# Patient Record
(demographics unavailable — no encounter records)

---

## 2024-10-24 NOTE — HISTORY OF PRESENT ILLNESS
[FreeTextEntry1] : Mimi is a pleasant 72-year-old patient presenting today with a chronic history of atraumatic left hand discomfort.  Patient states that daughter is a nurse and advised against steroid medication.

## 2024-10-24 NOTE — ASSESSMENT
[FreeTextEntry1] : ASSESSMENT: The patient comes in today with chronic exacerbated history of atraumatic left hand discomfort.  We have discussed small joint arthropathy and stiffness as well as tendinitis.  With this in mind we discussed treatment options we discussed activity modification.  The patient elects for oral medication.  This medication is not a steroid.  The patient was advised of the risks and benefits in the setting of diabetes.  With this in mind the patient also states that the daughter is a nurse and she advises against steroid medication.  We had a thorough good discussion regarding risks and benefits.   The patient was adequately and thoroughly informed of my assessment of their current condition(s).  - This may diminish bodily function for the extremity. We discussed prognosis, tx modalities including operative and nonoperative options for the above diagnostic assessment. As always, 2nd opinion is always provided as an option.  When accessible, I was able to review other physicians note(s) including reviewing other tests, imaging results as well as personally view these results for my own interpretation.   The patient was adequately and thoroughly informed of my assessment of their current condition(s). A prescription of Tylenol Extra Strength has been given to the patient. The risks, side effects, benefits and black box warnings were discussed.  The patient understands the risk profile and would like to take the medication. DISCUSSION:  1.  Rx as above.  We have discussed activity modification.  They will follow-up as needed 2. [x] 3. [x]

## 2024-10-24 NOTE — PHYSICAL EXAM
[de-identified] : Examination of the hand(s)  particularly at the A1 of the left ring reveals tenderness with a palpable click. Examination of the left middle finger PIP joint reveals tenderness and bogginess.  No signs of infection [de-identified] : [4] views of [bilateral hands and wrists] were obtained today in my office and were seen by me and discussed with the patient.  These [show findings consistent with bilateral basal joint OA and findings of IP joint OA]

## 2024-12-07 NOTE — HEALTH RISK ASSESSMENT
[Yes] : Yes [2 - 3 times a week (3 pts)] : 2 - 3  times a week (3 points) [1 or 2 (0 pts)] : 1 or 2 (0 points) [Never (0 pts)] : Never (0 points) [No falls in past year] : Patient reported no falls in the past year [0] : 2) Feeling down, depressed, or hopeless: Not at all (0) [PHQ-2 Negative - No further assessment needed] : PHQ-2 Negative - No further assessment needed [Former] : Former [> 15 Years] : > 15 Years [Audit-CScore] : 3 [[id number]] : 0

## 2024-12-07 NOTE — ADDENDUM
[FreeTextEntry1] :    IShaniceghar wrote this note acting as a scribe for Dr. Beverly Alberto MD on Dec 02, 2024 .   I, Dr. Beverly Alberto MD, ordering physician, have read and attest that all the information, medical decision making and discharge instructions within are true and accurate on 12/02/2024.

## 2024-12-07 NOTE — HISTORY OF PRESENT ILLNESS
[FreeTextEntry1] : follow up [de-identified] : 73 year y.o female pt is here today for follow up. Pt is in a good mood. Pt had family over thanksgiving and enjoyed spending time with her children. Pt is currently following up with ortho, OBGYN, cardiologist, and endocrinologist doctors. Pt is following up with Dr. Zacarias for weightloss management and DM. Pt sxs are currently stable. No active complaints today. UTD with immunization.

## 2024-12-07 NOTE — PLAN
[FreeTextEntry1] : Pt is here for a 3 month follow up. Pt has a physical exam with her company. everything is normal. Pt stopped smoking cigarettes 20 y.o. Pt is UTD with lung cancer screenings.   endocrinologist: Pt needs to see Dr. Zacarias because she needs prior authorization for 5.5mg Monjara to manage weightloss and DM. Pt also takes 1mg of Zepbound. Pt states without Monjara she has been feeling a bit of an appetite coming back. Her typical weight is 214lbs. Recommended dietary changes including 90-100g of protein, aerobic exercise and emphasized strength training regimen at least 2x weekly to maintain healthy lean muscle mass and bone density.  vaccines: Pt s UTD with flu, shingles, and pneumonia vaccines.    RTO in 3-6 months for f.u.

## 2024-12-07 NOTE — HEALTH RISK ASSESSMENT
[Yes] : Yes [2 - 3 times a week (3 pts)] : 2 - 3  times a week (3 points) [1 or 2 (0 pts)] : 1 or 2 (0 points) [Never (0 pts)] : Never (0 points) [No falls in past year] : Patient reported no falls in the past year [0] : 2) Feeling down, depressed, or hopeless: Not at all (0) [PHQ-2 Negative - No further assessment needed] : PHQ-2 Negative - No further assessment needed [Former] : Former [> 15 Years] : > 15 Years [Audit-CScore] : 3 [[id number]] : 0

## 2024-12-07 NOTE — HISTORY OF PRESENT ILLNESS
[FreeTextEntry1] : follow up [de-identified] : 73 year y.o female pt is here today for follow up. Pt is in a good mood. Pt had family over thanksgiving and enjoyed spending time with her children. Pt is currently following up with ortho, OBGYN, cardiologist, and endocrinologist doctors. Pt is following up with Dr. Zacarias for weightloss management and DM. Pt sxs are currently stable. No active complaints today. UTD with immunization.

## 2025-01-15 NOTE — PLAN
[TextEntry] : 73-year-old female in need of a screening colonoscopy.  She has had 2 recent failed attempts to complete the colonoscopy due to poor prep.  I recommend a 2-day bowel prep.  She will like the pill form of prep and this can be done 2 days prior to the colonoscopy.  She will remain on liquid diet for the 2 days preceding the colonoscopy.  The day before the colonoscopy, she will proceed with the usual MiraLAX and Dulcolax bowel prep. I reviewed the risks, benefits and alternative of pursuing a colonoscopy. Risks of colonoscopy was reviewed including but not limited to cardiopulmonary complications, risk of bleeding, infection, missed lesions, perforation which could result in emergent surgery. Will proceed with scheduling colonoscopy.  Hold Mounjaro for weight prior to procedure

## 2025-01-15 NOTE — HISTORY OF PRESENT ILLNESS
[FreeTextEntry1] : 73-year-old female who presents for consultation for a screening colonoscopy.  She denies any current gastrointestinal symptoms.  She has a history of a Whipple done for an IPMN.  She underwent attempt of colonoscopy with Dr. Wheat twice in the past 6 months but was incomplete due to inadequate prep.  She reports daily bowel movements without difficulty.  No constipation or diarrhea.  I had performed her colonoscopy in 2018 and although was completed, my report states inadequate bowel prep.

## 2025-01-15 NOTE — PHYSICAL EXAM
[Respiratory Effort] : normal respiratory effort [Normal Rate and Rhythm] : normal rate and rhythm [Calm] : calm [de-identified] : Soft, nontender, nondistended.  Midline laparotomy incision noted [de-identified] : well appearing, in no distress [de-identified] : normocephalic, atraumatic [de-identified] : moves extremities without difficulty [de-identified] : warm and dry [de-identified] : alert and oriented x 3

## 2025-01-15 NOTE — CONSULT LETTER
[Dear  ___] : Dear  [unfilled], [Consult Letter:] : I had the pleasure of evaluating your patient, [unfilled]. [Please see my note below.] : Please see my note below. [Consult Closing:] : Thank you very much for allowing me to participate in the care of this patient.  If you have any questions, please do not hesitate to contact me. [Sincerely,] : Sincerely, [FreeTextEntry3] : Emil Parra MD

## 2025-01-21 NOTE — HISTORY OF PRESENT ILLNESS
[FreeTextEntry1] : Interval hx : - had been off Mounjaro for 1 month due to cost issue - tolerating Mounjaro, she stopped Ozempic due to lack of glycemic benefit - losing weight with Mounjaro, appetite reduced more with Mounjaro  T2DM  - worse due to diet, cheating on cookies/cakes She denies history of diabetes but in 12/2018 A1c was 6.6% and she was started on Metformin. She is tolerating it well.  A1c levels have been in prediabetic range prior to this.  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   - did not tolerate glipizide- caused rash DM meds: MFN  mg 2 tabs BID, Jardiance 25 mg daily, Mounjaro 5 mg weekly SMBG: testing 2-3x daily, per pt -160s Complications; no DR on eye exam 2023, 2024 neg urine alb/Cr, denies neuropathy , h/o nonobstructive CAD in past now following with cardio ===================================================================================== h/o small pituitary tumor Dx with elevated prolactin levels (Prl 70's)- on Bromocriptine  5-6 years and dose decrease to 1 tab daily at last visit, she reported worsening prolactin levels off Bromocriptine in past. Has been off Bromocriptine since May 2020 and prolactin levels have been normal. 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 this was stopped 2023 when levels above 200 and started estrogen patches - Normal Bone Density 2024 - Mammo 2024 Birad 2 - PSA 2023 normal

## 2025-01-21 NOTE — DATA REVIEWED
[FreeTextEntry1] : Pituitary MRI 11/14/19 rathke cleft cyst   DXA 6/6/24 RESULTS: Spine: T-score: 1.0; previously . Z-score: 3.2 BMD: 1.155 g/cm2 Radius 1/3: T-score: 2.5; not imaged previously. Z-score: 4.9 BMD: 0847 g/cm2 IMPRESSION: Normal Bone Density. FRACTURE RISK:  The risk of fracture is not increased. TREATMENT RECOMMENDATIONS:  Based on NOF treatment guidelines medical therapy is not recommended at this time.

## 2025-01-21 NOTE — PHYSICAL EXAM
[Alert] : alert [EOMI] : extra ocular movement intact [Normal Rate and Effort] : normal respiratory rate and effort [Clear to Auscultation] : lungs were clear to auscultation bilaterally [Normal S1, S2] : normal S1 and S2 [Normal Rate] : heart rate was normal [No Edema] : no peripheral edema [Not Tender] : non-tender [Soft] : abdomen soft [2+] : 2+ in the dorsalis pedis [Oriented x3] : oriented to person, place, and time [Normal Affect] : the affect was normal [Normal Insight/Judgement] : insight and judgment were intact [Normal Mood] : the mood was normal [Vibration Dec.] : normal vibratory sensation at the level of the toes [Diminished Throughout Both Feet] : normal tactile sensation with monofilament testing throughout both feet [Acanthosis Nigricans] : no acanthosis nigricans

## 2025-01-21 NOTE — REASON FOR VISIT
[Follow - Up] : a follow-up visit [DM Type 2] : DM Type 2 [Transgender Care] : transgender care [Other___] : [unfilled]

## 2025-01-21 NOTE — ASSESSMENT
[FreeTextEntry1] : 71 year old female with:  1. T2DM - A1c worsening, worsening glucoses due to diet - h/o allergic reaction to glipizide - cont Mounjaro 5 mg weekly for 4 weeks, then 7.5 mg weekly x 4 weeks, then 10 mg weekly x 4 weeks, then 12.5 mg weekly x 4 weeks the 15 mg weekly - cont Jardiance 25 mg daily - cont MFN  mg 2 tabs BID - cont SMBG - repeat A1c 1 week before next visit - adhere with diabetic diet, stop sweets!!  2. h/o pituitary tumor on long term Bromocriptine. In 2019 pituitary hormonal testing normal and MRI showed Rathke cleft cyst, no pituitary tumor. has been off bromocriptine since May 2020 - prolactin normal 2022  3. transgender M--> F s/p sex reassignment surgery - DXA 2024 normal - Mammo 2024 [facility] 2, yearly mammo with PCP - PSA 2023 normal, yearly testing - overdue, will check with next labs - limited data in literature in treating old transgender women.  we  discussed continuation of estrogen therapy with low dose estrogen patches to provide some estrogen to protect bones but full replacement doses not needed at this age,  we also discussed risks of estrogen therapy in elderly women with regard to VTE and breast cancer - cont estradiol path 0.1 mg/ 2 patch twice weekly for ease of application and more steady estradiol levels - also discussed VTE risks and suggested low dose baby aspirin, but she has h/u bleeding ulcers and told to to avoid aspirin - routine breast/prostate cancer/bone density cancer screening  4. HLD - cont statin  5. Obesity - not losing weight , Mounjaro dosing as above, minimize sweets

## 2025-01-21 NOTE — REVIEW OF SYSTEMS
[As Noted in HPI] : as noted in HPI [Depression] : depression [Fatigue] : no fatigue [Blurred Vision] : no blurred vision [Chest Pain] : no chest pain [Shortness Of Breath] : no shortness of breath [SOB on Exertion] : no shortness of breath on exertion [Polyuria] : no polyuria [Nocturia] : no nocturia [Pain/Numbness of Digits] : no pain/numbness of digits [Polydipsia] : no polydipsia [FreeTextEntry2] : weight stable

## 2025-03-31 NOTE — HISTORY OF PRESENT ILLNESS
[FreeTextEntry1] : Mimi is a pleasant 72-year-old patient presenting today with a chronic history of atraumatic left hand discomfort.  Describes significant stiffness in bilateral hands.

## 2025-03-31 NOTE — PHYSICAL EXAM
[de-identified] : Examination of the hand(s)  particularly at the A1 of the bilateral middle reveals tenderness with a palpable click. [de-identified] : [4] views of [bilateral hands and wrists] were reviewed today in my office and were seen by me and discussed with the patient.  These [show findings consistent with bilateral basal joint OA and findings of IP joint OA]

## 2025-03-31 NOTE — ASSESSMENT
[FreeTextEntry1] : ASSESSMENT: The patient comes in today with chronic exacerbated history of atraumatic bilateral hand discomfort.  We have discussed small joint arthropathy and stiffness as well as tendinitis.  With this in mind we discussed treatment options we discussed activity modification today patient states that she would like to proceed with injections.  We have discussed risk and benefits in setting of diabetes.  We discussed risk of recurrence   The patient was adequately and thoroughly informed of my assessment of their current condition(s).  - This may diminish bodily function for the extremity. We discussed prognosis, tx modalities including operative and nonoperative options for the above diagnostic assessment. As always, 2nd opinion is always provided as an option.  When accessible, I was able to review other physicians note(s) including reviewing other tests, imaging results as well as personally view these results for my own interpretation.   The patient was adequately and thoroughly informed of my assessment of their current condition(s). Injection procedure for trigger finger tendon subsheath:   The risks and benefits of a steroid injection were discussed in detail. The risks include but are not limited to: pain, infection, swelling, flare response, bleeding, subcutaneous fat atrophy, skin depigmentation and/or elevation of blood sugar. The risk of incomplete resolution of symptoms, recurrence and additional intervention was reviewed and considered by the patient. The patient agreed to proceed and under a sterile prep, I injected 1 unit 6mg into 1 cc of a combination of Celestone and Lidocaine into the bilateral middle trigger tendon subsheath. The patient tolerated the injection well.  DISCUSSION:  1.  Injections as above.  Bracing activity modification. 2. [x] 3. [x]

## 2025-05-27 NOTE — ASSESSMENT
[FreeTextEntry1] : 73 year old female with:  1. T2DM - A1c suboptomial, hyperglycemia due to diet - h/o allergic reaction to glipizide - increase Mounjaro 10 mg weekly x 4 weeks, then 12.5 mg weekly x 4 weeks the 15 mg weekly - cont Jardiance 25 mg daily - cont MFN  mg 2 tabs BID - cont SMBG - repeat A1c 1 week before next visit - adhere with diabetic diet, stop sweets!!  2. h/o pituitary tumor on long term Bromocriptine. In 2019 pituitary hormonal testing normal and MRI showed Rathke cleft cyst, no pituitary tumor. has been off bromocriptine since May 2020 - prolactin normal 2022  3. transgender M--> F s/p sex reassignment surgery - DXA 2024 normal, repeatr AUBREY 2026 - Mammo 2024 [facility] 2, yearly mammo with PCP - PSA 2025 normal, yearly testing - limited data in literature in treating old transgender women.  we discussed continuation of estrogen therapy with low dose estrogen patches to provide some estrogen to protect bones but full replacement doses not needed at this age, we also discussed risks of estrogen therapy in elderly women with regard to VTE and breast cancer - also discussed VTE risks with estradiol and suggested low dose baby aspirin, but she has h/u bleeding ulcers and told to to avoid aspirin - routine breast/prostate cancer/bone density cancer screening - she self DC estradiol patch 1 month ago  4. HLD - cont statin  5. Obesity - not losing weight , Mounjaro dosing as above, minimize sweets  Patient screened positive on the PHQ-2 questionnaire. Submitted a referral for the patient to the [facility] for further evaluation and management.

## 2025-05-27 NOTE — PHYSICAL EXAM
[Alert] : alert [EOMI] : extra ocular movement intact [Normal Rate and Effort] : normal respiratory rate and effort [Clear to Auscultation] : lungs were clear to auscultation bilaterally [Normal S1, S2] : normal S1 and S2 [Normal Rate] : heart rate was normal [No Edema] : no peripheral edema [Not Tender] : non-tender [Soft] : abdomen soft [Oriented x3] : oriented to person, place, and time [Normal Affect] : the affect was normal [Normal Insight/Judgement] : insight and judgment were intact [Normal Mood] : the mood was normal [Acanthosis Nigricans] : no acanthosis nigricans

## 2025-05-27 NOTE — DATA REVIEWED
[FreeTextEntry1] : Pituitary MRI 11/14/19 rathke cleft cyst   DXA 6/6/24 RESULTS: Spine: T-score: 1.0; previously . Z-score: 3.2 BMD: 1.155 g/cm2 Radius 1/3: T-score: 2.5; not imaged previously. Z-score: 4.9 BMD: 0847 g/cm2 IMPRESSION: Normal Bone Density. FRACTURE RISK:  The risk of fracture is not increased. TREATMENT RECOMMENDATIONS:  Based on NOF treatment guidelines medical therapy is not recommended at this time.  Labs 3/21/25 estradiol 13, testo 9.9 PSA <0.01 A1c 8.9 GFR 92 Trig 159 LDL 57 HDL 38 urine alb/cr neg

## 2025-05-27 NOTE — HISTORY OF PRESENT ILLNESS
[FreeTextEntry1] : Interval hx : - tolerating Mounjaro, she stopped Ozempic due to lack of glycemic benefit - losing weight with Mounjaro - stopped estradiol patched 1 month ago - SMBG testing 2-3x daily 150-300's - not watching diet, "crap food"  T2DM  - worse due to diet, cheating on cookies/cakes She denies history of diabetes but in 12/2018 A1c was 6.6% and she was started on Metformin. She is tolerating it well.  A1c levels have been in prediabetic range prior to this.  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   - did not tolerate glipizide- caused rash DM meds: MFN  mg 2 tabs BID, Jardiance 25 mg daily, Mounjaro 7.5 mg weekly Complications; no DR on eye exam 2023, 2024 neg urine alb/Cr, denies neuropathy , h/o nonobstructive CAD in past now following with cardio ===================================================================================== h/o small pituitary tumor Dx with elevated prolactin levels (Prl 70's)- on Bromocriptine  5-6 years and dose decrease to 1 tab daily at last visit, she reported worsening prolactin levels off Bromocriptine in past. Has been off Bromocriptine since May 2020 and prolactin levels have been normal. 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 this was stopped 2023 when levels above 200 and started estrogen patches -- ? self Dx estradiol patch 4/2025 - Normal Bone Density 2024 - Mammo 2024 Birad 2 - PSA 2025 normal

## 2025-06-04 NOTE — HISTORY OF PRESENT ILLNESS
[FreeTextEntry1] : Mimi is a pleasant 73-year-old patient who presents today with chronic exacerbated bilateral ring finger discomfort and stiffness.  Symptoms are bothersome and are affecting ADLs.

## 2025-06-04 NOTE — ASSESSMENT
[FreeTextEntry1] : ASSESSMENT: The patient comes in today with chronic exacerbated bilateral ring finger discomfort and stiffness.  Symptoms are bothersome and are affecting ADLs.  Symptoms today are consistent with bilateral ring finger tendinopathy i.e. trigger fingers.  Treatment modalities were discussed, the patient elects for injections.  We have also discussed activity modifications and the risk of recurrence.   The patient was adequately and thoroughly informed of my assessment of their current condition(s).  - This may diminish bodily function for the extremity.  We discussed prognosis, treatment modalities including operative and nonoperative options for the above diagnostic assessment. As always, 2nd opinion is always provided as an option. For this, when accessible, I was able to review other physicians note(s) including reviewing other tests, imaging results as well as personally view these results for my own interpretation.     Injection Procedure: [Bilateral ring trigger finger tendon sheath] The risks and benefits of a steroid injection were discussed in detail. The risks include but are not limited to: pain, infection, swelling, flare response, bleeding, subcutaneous fat atrophy, skin depigmentation and/or elevation of blood sugar. The risk of incomplete resolution of symptoms, recurrence and additional intervention was reviewed and considered by the patient.  The patient agreed to proceed and under a sterile prep, I injected 1 unit (6mg) into 1 cc of a combination of Celestone and Lidocaine into the above stated location for the procedure. The patient tolerated the injection well.   The patient was adequately and thoroughly informed of my assessment of their current condition(s).   DISCUSSION: 1.  Injections as above. Activity modifications.  Risk of recurrence discussed. 2. [x] 3. [x]

## 2025-06-04 NOTE — PHYSICAL EXAM
[de-identified] : Examination of the hand(s) particularly at the A1 of the [bilateral ring] reveals tenderness with a palpable click. [de-identified] : [4] views of [bilateral hands and wrists] were reviewed today in my office and were seen by me and discussed with the patient.  These [show findings consistent with bilateral basal joint OA and findings of IP joint OA]